# Patient Record
Sex: FEMALE | Race: OTHER
[De-identification: names, ages, dates, MRNs, and addresses within clinical notes are randomized per-mention and may not be internally consistent; named-entity substitution may affect disease eponyms.]

---

## 2017-08-03 ENCOUNTER — HOSPITAL ENCOUNTER (EMERGENCY)
Dept: HOSPITAL 80 - FED | Age: 22
LOS: 1 days | Discharge: HOME | End: 2017-08-04
Payer: COMMERCIAL

## 2017-08-03 VITALS — RESPIRATION RATE: 16 BRPM
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
## 2017-08-03 DIAGNOSIS — S09.90XA: Primary | ICD-10-CM
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
## 2017-08-03 DIAGNOSIS — F17.200: ICD-10-CM
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
## 2017-08-03 LAB
% IMMATURE GRANULYOCYTES: 0.4 % (ref 0–1.1)
ABSOLUTE IMMATURE GRANULOCYTES: 0.03 10^3/UL (ref 0–0.1)
ABSOLUTE NRBC COUNT: 0 10^3/UL (ref 0–0.01)
ADD DIFF?: NO
ADD MORPH?: NO
ADD SCAN?: NO
ANION GAP SERPL CALC-SCNC: 14 MEQ/L (ref 8–16)
ATYPICAL LYMPHOCYTE FLAG: 10 (ref 0–99)
CALCIUM SERPL-MCNC: 10 MG/DL (ref 8.5–10.4)
CHLORIDE SERPL-SCNC: 106 MEQ/L (ref 97–110)
CO2 SERPL-SCNC: 21 MEQ/L (ref 22–31)
CREAT SERPL-MCNC: 0.6 MG/DL (ref 0.6–1)
ERYTHROCYTE [DISTWIDTH] IN BLOOD BY AUTOMATED COUNT: 13.2 % (ref 11.5–15.2)
ETHANOL SERPL-MCNC: < 10 MG/DL (ref 0–10)
FRAGMENT RBC FLAG: 0 (ref 0–99)
GFR SERPL CREATININE-BSD FRML MDRD: > 60 ML/MIN/{1.73_M2}
GLUCOSE SERPL-MCNC: 83 MG/DL (ref 70–100)
HCT VFR BLD CALC: 42.3 % (ref 38–47)
HGB BLD-MCNC: 14 G/DL (ref 12.6–16.3)
LEFT SHIFT FLG: 0 (ref 0–99)
LIPEMIA HEMOLYSIS FLAG: 80 (ref 0–99)
MCH RBC BLDCO QN: 29.2 PG (ref 27.9–34.1)
MCHC RBC AUTO-ENTMCNC: 33.1 G/DL (ref 32.4–36.7)
MCV RBC AUTO: 88.3 FL (ref 81.5–99.8)
NRBC-AUTO%: 0 % (ref 0–0.2)
PLATELET # BLD: 231 10^3/UL (ref 150–400)
PLATELET CLUMPS FLAG: 0 (ref 0–99)
PMV BLD AUTO: 10.8 FL (ref 8.7–11.7)
POTASSIUM SERPL-SCNC: 3.5 MEQ/L (ref 3.5–5.2)
RBC # BLD AUTO: 4.79 10^6/UL (ref 4.18–5.33)
SODIUM SERPL-SCNC: 141 MEQ/L (ref 134–144)

## 2017-08-03 PROCEDURE — G0480 DRUG TEST DEF 1-7 CLASSES: HCPCS

## 2017-08-03 NOTE — EDPHY
H & P


Stated Complaint: head injury at work, hit head against cabinet





- Personal History


LMP (Females 10-55): Unknown


Current Tetanus/Diphtheria Vaccine: Unsure


Current Tetanus Diphtheria and Acellular Pertussis (TDAP): No





- Medical/Surgical History


Hx Asthma: No


Hx Chronic Respiratory Disease: No


Hx Diabetes: No


Hx Cardiac Disease: No


Hx Renal Disease: No


Hx Cirrhosis: No


Hx Alcoholism: No


Hx HIV/AIDS: No


Hx Splenectomy or Spleen Trauma: No


Other PMH: Thyroid nodule, Treatment I131, ADHD, marijuana, depression





- Social History


Smoking Status: Light smoker


Time Seen by Provider: 08/03/17 22:51


HPI/ROS: 





CHIEF COMPLAINT:  Head injury





HISTORY OF PRESENT ILLNESS:  21-year-old female history of depression, arrives 

via private vehicle, not a trauma activation, complaining of acute head injury, 

altered mentation after she was at work at [employer] this evening, she 

remembers cleaning, stood up and hit her head with positive loss of 

consciousness.  She has been vomiting, complaining of nausea, dizziness, 

photophobia, headache , friend states that she is altered.  She denies alcohol 

or drug use.  Denies midline C-spine pain.  Denies back pain.  Denies 

peripheral paresthesia, weakness, numbness





PRIMARY CARE PROVIDER: worker's compensation 





REVIEW OF SYSTEMS:


A ten point review of systems was performed and is negative with the exception 

of the items mentioned in the HPI








PAST MEDICAL/SURGICAL HISTORY:  Depression.  No anticoagulant use history.  





SOCIAL HISTORY: denies alcohol use at time of incident





*********





PHYSICAL EXAM 





1) GENERAL: Well-developed, well-nourished, alert and oriented. Covering her 

face, averse to light and sound.  She is crying, hyperventilating


2) HEAD: Normocephalic, tender to palpation left frontal and parietal region.  

No hematoma.  No depression. 


3) HEENT: Pupils equal, round, reactive to light bilaterally. Negative Horners. 

Nasopharynx, oropharynx, clear.   No deformity or angulation of nose.  No 

septal hematoma.  No rhinorrhea. No oral trauma. Ears bilaterally with normal 

tympanic membranes.  No hemotympanum.  No fluid or blood in the external 

auditory canal.  No raccoon eyes.  No Bernard sign.  Teeth are normally aligned 

with no gross malocclusion, TMJ bilaterally nontender, facial bones nontender 

including the zygomatic arch, maxilla mandible.


4) NECK:  No cervical collar is on. Posterior cervical spine is nontender, no 

stepoff, no effusion. Full range of motion which does not elicit any midline 

cervical spine pain, no posterior midline tenderness, no step-off.  


5) LUNGS: Clear to auscultation bilaterally, no wheezes, no rhonchi, no 

retractions.  No obvious signs of trauma.  No chest wall pain.  No flaring, no 

grunting.  Moving symmetrically.  No crepitus. 


6) HEART: Regular rate and rhythm, 


7) ABDOMEN: No guarding, no rebound, no focal tenderness, no peritoneal signs, 

no signs of trauma, no ecchymosis


8) MUSCULOSKELETAL: Moving all extremities, no focal areas of tenderness, no 

obvious trauma.


9) BACK: No midline vertebral tenderness, no fluctuance, no step-off, no 

obvious trauma, no visual or palpable abnormality.


10) SKIN:  No laceration.  No abrasion





***********





DIFFERENTIAL DIAGNOSIS: Not necessarily in any particular order, my 

differential diagnosis includes, but is not limited to, concussion, skull 

fracture, intraparenchymal contusion, subarachnoid, subdural and epidural 

hematoma.  The patient understands that this diagnosis is provisional and can 

never be 100% accurate. (JACKY Michel)


Constitutional: 


 Initial Vital Signs











Temperature (C)  36.9 C   08/03/17 22:46


 


Heart Rate  77   08/03/17 22:46


 


Respiratory Rate  16   08/03/17 22:46


 


Blood Pressure  146/98 H  08/03/17 22:46


 


O2 Sat (%)  98   08/03/17 22:46








 











O2 Delivery Mode               Room Air














Allergies/Adverse Reactions: 


 





No Known Allergies Allergy (Unverified 08/03/17 22:53)


 








Home Medications: 














 Medication  Instructions  Recorded


 


Adderall 10 mg Tablet  04/04/17


 


Paroxetine HCl  PO 08/03/17














Medical Decision Making





- Diagnostics


Imaging: Discussed imaging studies w/ On call Radiologist


ED Course/Re-evaluation: 





10:58 p.m.Head CT ordered in this patient for trauma for the following 

indication: severe headache, vomiting, loss of consciousness and headache.





11:20 p.m.:  Called to patient's bedside as she is hyperventilating, she is 

crying, states that she is having an anxiety attack because she dropped her 

cell phone on the floor.  Will administer IV Ativan and revaluate





12:19 a.m.:  Re-evaluation, patient is sitting upright, answering questions 

appropriately, no short-term memory loss, nonfocal neurologic exam.  Discussed 

the negative imaging results.  Her anxiety has resolved according to the 

patient.  She feels comfortable being discharged.  Usual and customary head 

injury precautions and instructions have been provided as well as 2nd impact 

syndrome.  All questions and concerns addressed by myself. (JACKY Michel)


Other Provider: 





PHYSICIAN DOCUMENTATION:


The patient was evaluated and managed by the Physician Assistant.  My co-

signature indicates that I have reviewed this chart and I agree with the 

findings and plan of care as documented.  I am the secondary supervising 

physician.


 (Edwina Guerrero)





- Data Points


Laboratory Results: 


 Laboratory Results





 08/03/17 23:00 





 08/03/17 23:00 








Medications Given: 


 








Discontinued Medications





Lorazepam (Ativan Injection)  1 mg IVP EDNOW ONE


   Stop: 08/03/17 23:22


   Last Admin: 08/03/17 23:24 Dose:  1 mg








Departure





- Departure


Disposition: Home, Routine, Self-Care


Clinical Impression: 


Head injury


Qualifiers:


 Encounter type: initial encounter Qualified Code(s): S09.90XA - Unspecified 

injury of head, initial encounter





Condition: Good


Instructions:  Head Injury (ED)


Additional Instructions: 


ALTHOUGH THERE IS NO EVIDENCE OF SERIOUS HEAD INJURY AT THIS TIME, DELAYED 

SIGNS CAN APPEAR 24 TO 48 HOURS AFTER INJURY.  WE RECOMMEND THAT YOU DESIGNATE 

A FRIEND OR FAMILY MEMBER TO OBSERVE YOU OVER THE NEXT FEW DAYS TO ENSURE THAT 

YOUR CONDITION IS PROGRESSING NORMALLY.  PLEASE RETURN TO THE EMERGENCY 

DEPARTMENT (ED) IMMEDIATELY IF YOU HAVE INCREASED HEADACHE, PERSISTENT HEADACHE

, VOMITING, WEAKNESS, CONFUSION OR VISUAL PROBLEMS.  WE RECOMMEND THAT YOU DO 

NOT RESUME CONTACT SPORTS OR ACTIVITIES THAT TAKE COORDINATION OR BALANCE SUCH 

AS SKIING OR RIDING A BICYCLE UNTIL CLEARED TO DO SO BY YOUR DOCTOR OR BY A 

NEUROLOGIST.


Referrals: 


Follow-up, with your work comp provider in 1-2 days [Other] - 1-2 days without 

fail

## 2017-08-04 VITALS
SYSTOLIC BLOOD PRESSURE: 118 MMHG | DIASTOLIC BLOOD PRESSURE: 73 MMHG | OXYGEN SATURATION: 96 % | TEMPERATURE: 98.2 F | HEART RATE: 81 BPM

## 2018-01-31 ENCOUNTER — HOSPITAL ENCOUNTER (INPATIENT)
Dept: HOSPITAL 80 - FED | Age: 23
LOS: 5 days | Discharge: HOME | DRG: 885 | End: 2018-02-05
Attending: PSYCHIATRY & NEUROLOGY | Admitting: PSYCHIATRY & NEUROLOGY
Payer: COMMERCIAL

## 2018-01-31 DIAGNOSIS — R45.851: ICD-10-CM

## 2018-01-31 DIAGNOSIS — F31.60: Primary | ICD-10-CM

## 2018-01-31 DIAGNOSIS — J30.2: ICD-10-CM

## 2018-01-31 DIAGNOSIS — F12.959: ICD-10-CM

## 2018-01-31 DIAGNOSIS — F90.9: ICD-10-CM

## 2018-01-31 DIAGNOSIS — Z91.5: ICD-10-CM

## 2018-01-31 DIAGNOSIS — E03.9: ICD-10-CM

## 2018-01-31 LAB
CK SERPL-CCNC: 89 IU/L (ref 0–156)
PLATELET # BLD: 220 10^3/UL (ref 150–400)

## 2018-01-31 PROCEDURE — G0480 DRUG TEST DEF 1-7 CLASSES: HCPCS

## 2018-01-31 NOTE — EDPHY
HPI/HX/ROS/PE/MDM


Narrative: 





CHIEF COMPLAINT: Suicidal ideation, intentional overdose 4 days ago





HPI: This is a 23 y/o female with a history of depression and prior suicide 

attempts who arrives voluntarily with her friends for evaluation after an 

intentional medication overdose 4 days ago. She reports taking 28 pills of 

50mcg levothyroxine, 9 pills of 25mcg levothyroxine, 9 pills of 20mg Paxil, and 

22 pills 10mg Adderall. She says, "I took everything I had" and "I was trying 

to kill myself." She went to sleep and upon waking the next day she went to 

work. She denies vomiting, abdominal pain, or other symptoms after this 

ingestion. Her friends convinced her to come to the ED today for evaluation. 

She has previously attempted to kill herself by suffocation with a bag over her 

head. She denies recent alcohol or drug use. She currently has mild 

intermittent chest pain and mild dyspnea, general shakiness, and intermittent 

"hot and cold flashes."





REVIEW OF SYSTEMS:


Aside from elements discussed in the HPI, a comprehensive 10-point review of 

systems was reviewed and is negative.





PMH: Asthma, depression, prior suicide attempts





SOCIAL HISTORY: Lives in [place]. Single. Employed. 





PHYSICAL EXAM:


General:Patient is alert, in no acute distress.


ENT:Eyes are normal to inspection.  ENT inspection normal.


Neck: Normal inspection.  Full range of motion.


Respiratory:No respiratory distress.  Breath sounds normal bilaterally.


Cardiovascular: Regular rate and rhythm.  Strong peripheral pulses.  Normal cap 

refill.


Abdomen:The abdomen is nontender to palpation. There are no peritoneal signs.


Back: Normal to inspection.  No tenderness to palpation.


Skin: Normal color.  No rash.  Warm and dry.


Extremities: Normal appearance.  Full range of motion.


Neuro: Oriented x3.  Normal motor function.  Normal sensory function. (Tyson Dahl)


ED Course: 


This is a 23 y/o female with depression and SI who presents for evaluation 4 

days after suicidal attempt by drug overdose. She has since gone to work and 

experienced intermittent chest pain, dyspnea, and hot/cold flashes. Her exam is 

unremarkable today. Plan for IV, labs, EKG, and consult with poison control 

prior to mental health evaluation. The medications she took have some potential 

for arrhythmias, but given the relatively low doses, length of time since 

ingestion, and benign exam, my suspicion is low for acute toxicity. 





1845: Consulted with [place] Poison Control case #[id number]. They confirm 

dosages of these specific medications are not likely to cause symptoms. If her 

labs are normal she will be medically cleared. 





1900: I have placed patient on an M1 hold for suicide attempt by drug overdose. 





The 12 lead EKG was interpreted by myself. Sinus rhythm. See hard copy and/or 

"tracemaster" electronic copy for interpretation. (Tyson Dahl)





Patient has been accepted at [address] [place] [address] psychiatric [facility].  

Dr. Antoine has been accepted. EMTALA Filled out.  Appropriate transfer be set 

up. (Nav Bose)





- Data Points


Laboratory Results: 





 Laboratory Results





 01/31/18 18:36 





 01/31/18 18:36 





 











  01/31/18 01/31/18 01/31/18





  20:50 18:36 18:36


 


WBC      





    


 


RBC      





    


 


Hgb      





    


 


Hct      





    


 


MCV      





    


 


MCH      





    


 


MCHC      





    


 


RDW      





    


 


Plt Count      





    


 


MPV      





    


 


Neut % (Auto)      





    


 


Lymph % (Auto)      





    


 


Mono % (Auto)      





    


 


Eos % (Auto)      





    


 


Baso % (Auto)      





    


 


Nucleat RBC Rel Count      





    


 


Absolute Neuts (auto)      





    


 


Absolute Lymphs (auto)      





    


 


Absolute Monos (auto)      





    


 


Absolute Eos (auto)      





    


 


Absolute Basos (auto)      





    


 


Absolute Nucleated RBC      





    


 


Immature Gran %      





    


 


Immature Gran #      





    


 


Sodium      145 mEq/L mEq/L





     (135-145) 


 


Potassium      3.8 mEq/L mEq/L





     (3.5-5.2) 


 


Chloride      108 mEq/L mEq/L





     () 


 


Carbon Dioxide      20 mEq/l L mEq/l





     (22-31) 


 


Anion Gap      17 mEq/L H mEq/L





     (8-16) 


 


BUN      10 mg/dL mg/dL





     (7-23) 


 


Creatinine      0.6 mg/dL mg/dL





     (0.6-1.0) 


 


Estimated GFR      > 60 





    


 


Glucose      89 mg/dL mg/dL





     () 


 


Calcium      9.5 mg/dL mg/dL





     (8.5-10.4) 


 


Creatine Kinase      89 IU/L IU/L





     (0-156) 


 


Beta HCG, Qual    NEGATIVE   





    


 


Salicylates      < 1.0 mg/dL L mg/dL





     (2.0-20.0) 


 


Urine Opiates Screen  NEGATIVE     





   (NEGATIVE)   


 


Acetaminophen      < 10 mcg/mL L mcg/mL





     (10-30) 


 


Urine Barbiturates  NEGATIVE     





   (NEGATIVE)   


 


Ur Phencyclidine Scrn  NEGATIVE     





   (NEGATIVE)   


 


Ur Amphetamine Screen  NEGATIVE     





   (NEGATIVE)   


 


U Benzodiazepines Scrn  NEGATIVE     





   (NEGATIVE)   


 


Urine Cocaine Screen  NEGATIVE     





   (NEGATIVE)   


 


U Marijuana (THC) Screen  NON-NEGATIVE  H     





   (NEGATIVE)   














  01/31/18





  18:36


 


WBC  5.44 10^3/uL 10^3/uL





   (3.80-9.50) 


 


RBC  4.67 10^6/uL 10^6/uL





   (4.18-5.33) 


 


Hgb  13.7 g/dL g/dL





   (12.6-16.3) 


 


Hct  40.4 % %





   (38.0-47.0) 


 


MCV  86.5 fL fL





   (81.5-99.8) 


 


MCH  29.3 pg pg





   (27.9-34.1) 


 


MCHC  33.9 g/dL g/dL





   (32.4-36.7) 


 


RDW  12.3 % %





   (11.5-15.2) 


 


Plt Count  220 10^3/uL 10^3/uL





   (150-400) 


 


MPV  10.6 fL fL





   (8.7-11.7) 


 


Neut % (Auto)  68.3 % %





   (39.3-74.2) 


 


Lymph % (Auto)  25.2 % %





   (15.0-45.0) 


 


Mono % (Auto)  4.8 % %





   (4.5-13.0) 


 


Eos % (Auto)  0.9 % %





   (0.6-7.6) 


 


Baso % (Auto)  0.6 % %





   (0.3-1.7) 


 


Nucleat RBC Rel Count  0.0 % %





   (0.0-0.2) 


 


Absolute Neuts (auto)  3.72 10^3/uL 10^3/uL





   (1.70-6.50) 


 


Absolute Lymphs (auto)  1.37 10^3/uL 10^3/uL





   (1.00-3.00) 


 


Absolute Monos (auto)  0.26 10^3/uL L 10^3/uL





   (0.30-0.80) 


 


Absolute Eos (auto)  0.05 10^3/uL 10^3/uL





   (0.03-0.40) 


 


Absolute Basos (auto)  0.03 10^3/uL 10^3/uL





   (0.02-0.10) 


 


Absolute Nucleated RBC  0.00 10^3/uL 10^3/uL





   (0-0.01) 


 


Immature Gran %  0.2 % %





   (0.0-1.1) 


 


Immature Gran #  0.01 10^3/uL 10^3/uL





   (0.00-0.10) 


 


Sodium  





  


 


Potassium  





  


 


Chloride  





  


 


Carbon Dioxide  





  


 


Anion Gap  





  


 


BUN  





  


 


Creatinine  





  


 


Estimated GFR  





  


 


Glucose  





  


 


Calcium  





  


 


Creatine Kinase  





  


 


Beta HCG, Qual  





  


 


Salicylates  





  


 


Urine Opiates Screen  





  


 


Acetaminophen  





  


 


Urine Barbiturates  





  


 


Ur Phencyclidine Scrn  





  


 


Ur Amphetamine Screen  





  


 


U Benzodiazepines Scrn  





  


 


Urine Cocaine Screen  





  


 


U Marijuana (THC) Screen  





  














General


Time Seen by Provider: 01/31/18 18:29


Initial Vital Signs: 





 Initial Vital Signs











Temperature (C)  36.6 C   01/31/18 18:22


 


Heart Rate  88   01/31/18 18:22


 


Respiratory Rate  18   01/31/18 18:22


 


Blood Pressure  119/81 H  01/31/18 18:22


 


O2 Sat (%)  96   01/31/18 18:22








 











O2 Delivery Mode               Room Air














Allergies/Adverse Reactions: 


 





No Known Allergies Allergy (Verified 01/31/18 18:21)


 








Home Medications: 














 Medication  Instructions  Recorded


 


Adderall 10 mg Tablet  04/04/17


 


Paroxetine HCl PO 08/03/17


 


Levothyroxine  01/31/18














Departure





- Departure


Disposition: [facility] IP


Clinical Impression: 


Suicidal behavior


Qualifiers:


 Attempted self-injury: without attempted self-injury Qualified Code(s): R46.89 

- Other symptoms and signs involving appearance and behavior





Condition: Fair


Referrals: 


VAL LARIOS [Primary Care Provider] - As per Instructions


Report Scribed for: Tyson Dahl


Report Scribed by: Farhana Paulino


Date of Report: 01/31/18


Time of Report: 18:53


Physician Review and Approval Statement: Portions of this note were transcribed 

by an ED scribe.  I personally performed the history, physical exam, and 

medical decision making; and confirm the accuracy of the information in the 

transcribed note.

## 2018-02-01 VITALS — RESPIRATION RATE: 14 BRPM

## 2018-02-01 RX ADMIN — CETIRIZINE HYDROCHLORIDE SCH MG: 10 TABLET, FILM COATED ORAL at 12:32

## 2018-02-01 NOTE — BCON
[f 
rep st]



                                                  BEHAVIORAL HEALTH CONSULTATION





INTERNAL MEDICINE CONSULTATION



DATE OF CONSULTATION:  02/01/2018



REFERRING PHYSICIAN:  Guy Antione MD



REASON FOR REFERRAL:  Medical clearance for inpatient behavioral health stay.



HISTORY OF PRESENT ILLNESS:  This patient was brought into the emergency 
department with her friends several days after a suicide attempt in which she 
took all of her medications including Adderall, paroxetine, and levothyroxine.  
She was evaluated by the mental health team and admitted for further 
psychiatric care. 



She currently complains of feeling tired and says that she got in late last 
night.



PAST MEDICAL HISTORY:  

1.  Thyroid nodules, status post radioactive ablation.

2.  Hypothyroidism.

3.  Asthma.

4.  Concussions x2, and she denies any chronic sequelae.

5.  Depression.

6.  Seasonal allergies.



MEDICATIONS: 

1.  Levothyroxine.

2.  Paroxetine.

3.  Adderall.



SOCIAL HISTORY:  She lives with roommates.  She has completed 1 semester of 
college.  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  She 
is a light smoker.  She uses marijuana about twice a week.



FAMILY HISTORY:  Noncontributory.



REVIEW OF SYSTEMS:  She reports a reduced appetite.  She denies weight loss, 
nausea, vomiting, constipation, or diarrhea.  She denies difficulty breathing 
or cough, fevers or chills.  Otherwise, a 10-point review of systems is 
negative.



PHYSICAL EXAM:  VITAL SIGNS:  Blood pressure is 125/68, heart rate is 77, 
respiratory rate is 14, oxygen saturation 97% on room air, temperature is 36.9 
degrees centigrade.  Her weight is 56.7 kg for a body mass index of 22.9.  
GENERAL:  This is a well-nourished, well-developed woman, appears her 
chronologic age, cooperative and in no acute distress.  She is napping with the 
lights off in the room.  She awakens easily and is cooperative with the exam.  
HEENT:  Extraocular movements are intact.  Pupils are equal, round, reactive to 
light.  Mucous membranes are moist.  Dentition is in good condition.  She has 
an uncrowded airway, Mallampati class 1.  NECK:  Supple with no thyromegaly.  
HEART:  Regular rate and rhythm with no murmurs, rubs, or gallops.  LUNGS:  
Clear to auscultation bilaterally.  ABDOMEN:  Benign.  EXTREMITIES:  There is 
no cyanosis, clubbing, or edema.  Radial and dorsalis pedis pulses are 2+ 
bilaterally.  NEUROLOGIC:  She is alert and oriented x3.  Cranial nerves 2-12 
are grossly intact.  There is no focal weakness and sensation is intact to 
light touch.



LABORATORY STUDIES:  From the emergency department, CBC was overall within 
normal limits.  She had a slight decrement of absolute monocytes of no clinical 
significance.  Serum chemistry revealed a slightly low carbon dioxide at 20 and 
a slightly high anion gap at 17.  These are also likely without clinical 
significance.  Otherwise, renal function and electrolytes were within normal 
limits.  Hemoglobin A1c was normal at 5.2.  Lipid panel was benign.  Beta hCG 
was negative for pregnancy.  Toxicology screen in the serum was negative for 
salicylates or acetaminophen, and in the urine was non-negative for marijuana, 
but otherwise negative for substances of abuse.



ASSESSMENT/RECOMMENDATIONS:  

1.  Mental health issues, pending further evaluation and management per 
Psychiatry and the mental health team.

2.  Hypothyroidism, status post radioactive ablation for thyroid nodules.  
Looking at her lab history, she had a suppressed TSH approximately 2 years ago, 
then she had an elevated TSH to 12.3 on 04/22/2017.  Subsequently, her TSH has 
been normal or only slightly elevated.  On 01/09/2018, it was 4.84, which is 
only slightly elevated.  It is possible that she would benefit from a higher 
dose of levothyroxine.  However, this can be done under the care of her primary 
care physician.

3.  Asthma, by history, currently without any symptoms.  Albuterol has been 
ordered on a p.r.n. basis and this is appropriate.

4.  Tobacco dependence.  She was encouraged to quit smoking.

5.  Seasonal allergies.  She has a prescription for scheduled cetirizine, and 
this is appropriate if she has been symptomatic. 



I see no medical contraindications to this patient's continued stay in the 
inpatient behavioral health unit or to any psychiatric medications or 
procedures. 



Thank you very much for including me in the care of this patient.  Please do 
not hesitate to contact me or the hospitalist service should there be need for 
further medical evaluation.





Job #:  [id number]

MTDD

## 2018-02-01 NOTE — BAPA
[f 
rep st]



                                                  ADMISSION PSYCHIATRIC 
ASSESSMENT





IDENTIFICATION:  This is a 22-year-old single [place]ian female, who lives with 
a friend, who is currently unemployed, who has recently enrolled at the 
[employer] [facility] [employer].



CHIEF COMPLAINT:  "I felt really bad."



HISTORY OF PRESENT ILLNESS:  The patient reports on Saturday, January 27, she 
felt depressed, hopeless and suicidal, and overdosed on a combination of 
levothyroxine, Paxil and Adderall.  It appears she took approximately 9 tablets 
of all 3 of these medications.  The patient reports she has been dealing with 
depression and mood swings for about a year and a half.  She reports feeling 
down, sad, hopeless, helpless, with thoughts of death and suicide, along with 
low energy, low activity and excessive amounts of sleep, alternating with 1-4 
days of elevated energy, elevated activity, racing thoughts, impulsive spending
, and having elevated mood and activity.  She denies any paranoia or 
hallucinations.  She denies any recent violent behavior or violent thoughts.  
She reports 1 prior suicide attempt in December 2016, where she tried to 
suffocate herself with a bag.  She reports due to her symptoms, she has been 
unable to work, and she took leave from school.  She completed the fall 
semester at the [employer], but decided to not go to school this 
semester.  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  The 
patient also reports that due to her symptoms, she has had severe fatigue.  She 
has followed up with her primary care provider, who has been prescribing her 
levothyroxine for hypothyroidism.  The patient apparently was initially started 
on 25 mcg a day of levothyroxine, and earlier in January had a borderline 
elevated TSH, and the dose was increased to 50 mcg.  Despite this, the patient 
continued to have weeks at a time with low energy, low activity, and excessive 
sleep and low mood.  The patient reports stress due to having low energy, low 
activity, and mood swings interfering with her ability to go to school or work.
  The patient reports using cannabis twice a week.  Denies nicotine use.  
Denies alcohol use.  Denies other drug abuse.  The patient reports on Saturday, June 27, after overdosing that she slept.  The following morning, Sunday morning
, she told her friends who live nearby that she had overdosed.  They had made 
her an appointment to see her medical doctor on Monday, January 29.  However, 
she overslept and did not go to that appointment.  Then on Tuesday, the 30th, 
her friends encouraged her to go the emergency room for evaluation.  In the 
emergency room, she was placed on an M1 hold due to concern she was a danger to 
herself.  The patient reports after overdosing on her medications on January 27
, that she has not taking any of the 3 medications at all.  The patient denies 
any recent change in her physical health other than low energy.  She denies 
headaches, visual changes, weakness, tremors, nausea, vomiting or diarrhea.



PAST PSYCHIATRIC HISTORY:  The patient denies psychiatric hospitalizations in 
the past.  She had 1 prior suicide attempt in December 2016, where she tried to 
strangle herself or suffocate herself.  She does report that she is taking 
Paxil 20mg for depression for about 12 months without clear benefit.  She does 
report taking Adderall for ADHD starting in September.  She reports this was 
beneficial.  In the prescription drug monitoring program, it lists Adderall XR 
10 mg daily from her primary care provider, Dr. Han Conley.  The patient 
denies other psychiatric medication trials.  She denies any history of violence 
toward others or any arrests.  She is not currently in any outpatient 
psychiatric treatment, and is getting her psychiatric medications from her 
primary care provider.  She has seen a therapist at [facility] named Petty Johnson in the past.



ALLERGIES:  No known drug allergies.



PAST MEDICAL HISTORY:  She has seasonal allergies and normally takes Zyrtec.  
She has a history of asthma and uses p.r.n. albuterol inhaler.  She may have 
had 2 concussions with loss of consciousness lasting a few seconds at a time, 
not requiring any type of medical treatment in the past.  She denies surgeries 
on her body.



LABS:  The emergency department physician read her EKG as being within normal 
limits, but the actual report for that is not available at this time.  



Her CBC was within normal limits, with a white blood cell count 5.4, hemoglobin 
13.7, platelet count 220, sodium 145, potassium 3.8, creatinine 0.6, glucose 
89.  Serum beta HCG was negative.  Add on labs ordered today include a lipid 
panel, which showed triglycerides 68, LDL 71, HDL 64, which are all normal.  
Hemoglobin A1c is pending.  Her urine drug screen in the Emergency Department 
was positive for cannabis, but negative for other drugs of abuse.  Her serum 
was negative for acetaminophen or salicylates as well.  Of note, in the old 
records in our system, it looks like on January 9, 2018, she had a TSH 4.8, 
free T4 1.02, free T3 4.4.  The patient reports at that time, she had been 
taking 25 mcg a day of levothyroxine, and the dose was increased to 50 mcg 
after that.  In November 2017, her liver function tests were normal.  



In August 2017, she had a head CT that was normal.



VITAL SIGNS:  She is 157 cm tall, 56.6 kg with a BMI of 22.9.  Her blood 
pressure is 125/68, heart rate 77, respiratory rate 14, pulse ox 97% on room air
, temperature afebrile.



MENTAL STATUS EXAM:  She is a white female in no acute distress.  She is 
ambulatory.  She is wearing all black clothing.  She has glasses.  She has mild 
acne.  She has no focal tremors or weakness.  Her speech is soft, regular rate 
and rhythm.  Her affect is labile at times.  She is smiling and joking.  Other 
times she is sad and dysphoric.  Her thoughts are organized with fair detail.  
Her mood is "Up and down, sometimes deep depression, other times happiness.  
Sometimes I cry and upset.  Other times I am tired and sleep too much."  She 
denies plans to hurt herself on the unit.  She reports thoughts of hopelessness 
and being dead, but denies a plan to harm herself at this point.  She denies 
violent thoughts.  She denies auditory hallucinations or paranoia.  Her insight 
appears to be limited.  Her judgment appears to be questionable at this point.



ASSESSMENT:  

Bipolar disorder type 2, most recent episode depressed with mixed features.  

Rule out cannabis use disorder, financial and occupational stressors.  

History of ADHD



The overall assessment is the patient has been on Paxil for about a year, as 
well as Adderall for about 5 months, but is having alternating hypomanic and 
depressive episodes.  The patient reports having mixed manic and depressive 
symptoms over the past 2 weeks with an impulsive overdose on Saturday, January 27.  The patient has been off medications since then, which is about 5 days.  
The patient has fair insight and is willing to get treatment, but appears 
labile and possibly impulsive.



PLAN:  

1.  The patient is on M1 hold for evaluation due to concerns she is a danger to 
herself.

2.  The patient is on suicide precautions on the unit.

3.  We will discontinue Paxil and Adderall.  The patient has not taken other 
medications for about 5 days since her overdose.

4.  Restart the patient's Zyrtec for seasonal allergies.

5.  We will restart the patient's levothyroxine 50 mcg daily.  The patient will 
be seen by a hospitalist either today or tomorrow for a baseline physical exam.

6.  The patient was provided with education about bipolar disorder.  She was 
also given information about the risks of psychiatric medications causing birth 
defects and miscarriage.

7.  Discussed the risks and benefits of lithium, Depakote, Seroquel, and 
Abilify.  The patient prefers to start Abilify.  Discussed that Abilify can 
interact with Paxil causing elevated Abilify levels, and so it is unsafe to 
restart Paxil while taking Abilify.  The patient was agreeable to start Abilify 
after discussion of the risks of tardive dyskinesia, neuroleptic malignant 
syndrome, metabolic syndrome, birth defects and miscarriage.  We will start 1 
mg today, monitor for side effects, and then increase to 2 mg tomorrow in the 
morning as a mood stabilizer.

8.  I left a message at [facility], phone [phone] for 
Dr. Han Conley.  The patient did sign a release information.  I left a 
message that the patient's Paxil and Adderall were being discontinued, and the 
patient will be started on a mood stabilizer, and referred to outpatient 
psychiatric followup after discharge.

9.  The patient has a p.r.n. albuterol inhaler if having asthma symptoms on the 
unit.

10.  There is a hemoglobin A1c pending.

11.  The patient's primary supports outside of her friends are her mother and 
stepfather in [place].  The care coordinator will obtain release 
information for them to coordinate discharge planning.

12.  The patient has lorazepam 0.5 mg p.o. q.4 hours p.r.n. ordered for severe 
anxiety.  If having severe agitation on the unit, olanzapine 5 mg p.o. q.6 
hours p.r.n. for severe agitation.

13.  We will monitor the patient's mood stability, impulse control, judgment, 
behavior, and risk of self-harm on the inpatient unit.





Job #:  [id number]

MTDD

## 2018-02-02 RX ADMIN — LEVOTHYROXINE SODIUM SCH MCG: 50 TABLET ORAL at 08:22

## 2018-02-02 RX ADMIN — CETIRIZINE HYDROCHLORIDE SCH MG: 10 TABLET, FILM COATED ORAL at 08:22

## 2018-02-02 NOTE — SOAPPROG
SOAP Progress Note


Assessment/Plan: 


Assessment:





Bipolar Disorder type 2, most recent depressed with mixed features


Thyroid disease (history of hyperthyroidism S/P nodule ablation, now hypothyroid

), on levothyroxine





Patient has improved affect and appears more hopeful with better judgment.


Patient may be at risk for elevated Abilify level (recent Paroxetine use - 

CYP2D6 inhibitor)





Plan:


Patient agrees to voluntary treatment 


SP-1 precaution


Continue Abilify 2mg QAM for mood stability, monitor for EPS


Monitor mood symptoms, risk of self-harm


Possible discharge Monday 2/5/18 after family meeting with mother who is 

traveling from [place] if symptoms stable








02/02/18 12:53





Subjective: 





CC:  "Alright, bored"





Patient reports tolerating Abilify without side effects.  Reports mood mostly 

stable today, denies racing thoughts, agitation, or severe dysphoria.  Reports 

good visit from 3 friends yesterday.  Reports mother will arrive in [place] 

Sunday.  Reports wanting to live for family and friends and to complete college 

degree in film.  Reports feeling somewhat tired today, unsure if that is from 

medication.  Denies plan to harm self on unit.  


Objective: 





 Vital Signs











Temp Pulse Resp BP Pulse Ox


 


 36.6 C   76   14   93/54 L  96 


 


 02/02/18 06:00  02/02/18 06:00  02/02/18 06:00  02/02/18 06:00  02/02/18 06:00








Alert HF.  Calm and cooperative with better eye contact.  Affect euthymic.  

Mood 'alright, bored'  Thoughts organized.  Denies SI or HI.  Denies AH or 

paranoia.  Fair insight.  Appropriate judgment





Staff report patient slept 9 hours, attending groups, calm and appropriate on 

the unit.





- Time Spent With Patient


Time Spent With Patient: 





35 minutes








- Pending Discharge


Pending Discharge Within 24 Hours: No


Pending Discharge Within 48 Hours: No





ICD10 Worksheet


Patient Problems: 


 Problems











Problem Status Onset


 


Bipolar 2 disorder Acute  


 


Hypothyroidism Acute  


 


Suicidal behavior Acute

## 2018-02-03 RX ADMIN — LEVOTHYROXINE SODIUM SCH MCG: 50 TABLET ORAL at 08:20

## 2018-02-03 RX ADMIN — CETIRIZINE HYDROCHLORIDE SCH MG: 10 TABLET, FILM COATED ORAL at 08:20

## 2018-02-03 NOTE — SOAPPROG
SOAP Progress Note


Assessment/Plan: 


Assessment:








Per Dr. Barrios's note:


Assessment:





Bipolar Disorder type 2, most recent depressed with mixed features


Thyroid disease (history of hyperthyroidism S/P nodule ablation, now hypothyroid

), on levothyroxine





Patient has improved affect and appears more hopeful with better judgment.


Patient may be at risk for elevated Abilify level (recent Paroxetine use - 

CYP2D6 inhibitor)





Plan:


Patient agrees to voluntary treatment 


SP-1 precaution


Continue Abilify 2mg QAM for mood stability, monitor for EPS


Monitor mood symptoms, risk of self-harm


Possible discharge Monday 2/5/18 after family meeting with mother who is 

traveling from [place] if symptoms stable














Plan:





02/03/18 15:02


1. Patient continues to tolerate Abilify, denies any SE's.


2. Reports improved mood, no SI. 


3. Looking forward to going back to [place] with MOC


Subjective: 


Met with patient, reviewed chart and d/w staff. Patient presents bright affect, 

cheerful, smiling, talking in group therapy. She says she is feeling "refreshed

" today and in "much better" mood overall, no longer depressed, feels more 

positive and optimistic about future. Denies any SI/HI. 





Objective: 





 Vital Signs











Temp Pulse Resp BP Pulse Ox


 


 36.4 C   76   14   93/54 L  96 


 


 02/03/18 06:00  02/02/18 06:00  02/02/18 06:00  02/02/18 06:00  02/02/18 06:00








MSE: Affect: Euthymic  Mood: "Better"  TP: Linear, goal-directed  TC: Denies 

any SI/HI, no AH/VH  Insight/Judgment: Fair





- Time Spent With Patient


Time Spent With Patient: 


20"








- Pending Discharge


Pending Discharge Within 24 Hours: No


Pending Discharge Within 48 Hours: No





ICD10 Worksheet


Patient Problems: 


 Problems











Problem Status Onset


 


Asthma Acute  


 


Bipolar 2 disorder Acute  


 


Hypothyroidism Acute  


 


Suicidal behavior Acute

## 2018-02-04 RX ADMIN — LEVOTHYROXINE SODIUM SCH MCG: 50 TABLET ORAL at 09:22

## 2018-02-04 RX ADMIN — CETIRIZINE HYDROCHLORIDE SCH MG: 10 TABLET, FILM COATED ORAL at 08:58

## 2018-02-04 NOTE — SOAPPROG
SOAP Progress Note


Assessment/Plan: 


Assessment:








Per Dr. Barrios's note:


Assessment:





Bipolar Disorder type 2, most recent depressed with mixed features


Thyroid disease (history of hyperthyroidism S/P nodule ablation, now hypothyroid

), on levothyroxine





Patient has improved affect and appears more hopeful with better judgment.


Patient may be at risk for elevated Abilify level (recent Paroxetine use - 

CYP2D6 inhibitor)





Plan:


Patient agrees to voluntary treatment 


SP-1 precaution


Continue Abilify 2mg QAM for mood stability, monitor for EPS


Monitor mood symptoms, risk of self-harm


Possible discharge Monday 2/5/18 after family meeting with mother who is 

traveling from [place] if symptoms stable














Plan:





02/03/18 15:02


1. Patient continues to tolerate Abilify, denies any SE's.


2. Reports improved mood, no SI. 


3. Looking forward to going back to [place] with MOC





02/04/18 13:38


1. CCM - stable on Abilify, denies any SI/HI


2. Patient very excited about visitors this afternoon, eagerly awaiting MOC's 

arrival from [place]





Subjective: 


Met with patient, reviewed chart and d/w staff. Patient has bright, cheerful 

affect. She was very excited about her MOC coming possibly tonight or tomorrow. 

She denies any SI/HI, not feeling depressed any more. 





Objective: 





 Vital Signs











Temp Pulse Resp BP Pulse Ox


 


 36.6 C   73   14   98/60 L  96 


 


 02/04/18 06:00  02/04/18 06:00  02/04/18 06:00  02/04/18 06:00  02/04/18 06:00








MSE: Affect: Euthymic  Mood: "Good"  TP: Linear  TC: No SI/HI  Insight/Judgment

: Fair





- Time Spent With Patient


Time Spent With Patient: 


20"








- Pending Discharge


Pending Discharge Within 24 Hours: No


Pending Discharge Within 48 Hours: No





ICD10 Worksheet


Patient Problems: 


 Problems











Problem Status Onset


 


Asthma Acute  


 


Bipolar 2 disorder Acute  


 


Hypothyroidism Acute  


 


Suicidal behavior Acute

## 2018-02-04 NOTE — CPEKG
Heart Rate: 89

RR Interval: 674

P-R Interval: 132

QRSD Interval: 76

QT Interval: 372

QTC Interval: 453

P Axis: -22

QRS Axis: 64

T Wave Axis: 18

EKG Severity - NORMAL ECG -

EKG Impression: SINUS RHYTHM

Electronically Signed By: Lizeth King 07-Feb-2018 09:08:59

## 2018-02-05 VITALS
OXYGEN SATURATION: 97 % | TEMPERATURE: 98.4 F | DIASTOLIC BLOOD PRESSURE: 70 MMHG | HEART RATE: 97 BPM | SYSTOLIC BLOOD PRESSURE: 111 MMHG

## 2018-02-05 RX ADMIN — CETIRIZINE HYDROCHLORIDE SCH MG: 10 TABLET, FILM COATED ORAL at 07:55

## 2018-02-05 NOTE — BDS
[f 
rep st]



                                                  BEHAVIORAL HEALTH DISCHARGE 
SUMMARY

DATE OF DISCHARGE:  February 5th 2018.



ADMITTING DIAGNOSES:  

1.  Bipolar disorder type 2, most recent episode depressed with mixed features.
  

2.  History of attention deficit hyperactivity disorder.



IDENTIFICATION:  This is a 22-year-old single  female who lives with 
friends here in [place].  She is single with no children.  Her Mother and 
Stepfather live in [place].  The patient is not currently in school, but 
has completed 3 semesters at the [employer] studying film.



BRIEF PSYCHIATRIC AND MEDICAL HISTORY:  Please see psychiatric assessment 
history dated February 1, 2018.  Patient was receiving Paxil and Adderall from 
her primary care provider.  She has a history of a thyroid nodule treatment 
with radioactive iodine and subsequent hypothyroidism, as well as a history of 
asthma.



ALLERGIES:  No known drug allergies.



REASON FOR ADMISSION:  The patient was taken to the emergency room by her 
friends.  She had reported that she had overdosed on Paxil, levothyroxine, and 
Adderall approximately 5 days prior to admission to the emergency department on 
January 31, 2018.  She was then admitted to the inpatient unit on M1 hold due 
to concern she was a danger to herself.



INITIAL EXAMINATION:  She was alert,  female who is thin.  She had some 
acne, glasses.  She wears dark clothing.  Her speech is regular rate and 
rhythm.  Her affect was labile.  She described her mood as "up and down," and 
described both depressive and hypomanic symptoms and racing thoughts along with 
poor attention.  She denied further suicidal thoughts, denied homicidal thoughts
, auditory hallucinations or paranoia.  She had fair insight but questionable 
judgment.



HOSPITAL COURSE:  The patient was admitted on an M1 hold and then agreed to 
stay in the hospital voluntarily.  The patient appeared to have bipolar 
disorder type 2, as she reported past hypomanic episodes along with depressive 
episodes.  She also reported history of attention deficit hyperactivity 
disorder symptoms.  The patient's Adderall was discontinued.  She had not taken 
it for 5 days and it was unclear if she had attention deficit hyperactivity 
disorder symptoms separate from her mood disorder.  The patient's Paxil was not 
restarted as it appeared that she was having mixed depressive and hypomanic 
symptoms prior to admission and had not taken Paxil for at least 5 days.  The 
patient was given information about the risks and benefits of taking a mood 
stabilizer including lithium, Depakote, Seroquel, and Abilify.  The patient was 
agreeable to start Abilify.  She was warned about the risk of tardive dyskinesia
, neuroleptic malignant syndrome, metabolic syndrome, and birth defects.  She 
was also counseled about the risks of elevated Abilify levels due to recent 
Paxil use.  The patient was initially given 1 mg of Abilify and did not have 
side effects, and then it was increased to 2 mg daily.  The patient tolerated 
Abilify without side effects.  She had a marked improvement in her mood 
stability.  She reported a subjective improvement in her mood.  She became more 
hopeful about the future.  She denied suicidal thoughts on the unit.  She was 
calm, pleasant, able to attend groups, was eating well and sleeping well on the 
unit.  The patient signed release information for her Mother and her Stepfather 
in [place] to come out to [place] to facilitate discharge.  They will be 
taking the patient at discharge today to assist her in getting her 
prescriptions filled and will assist her in getting outpatient medical and 
mental health followup.  The patient does have a significant history of thyroid 
disease.  She apparently had a thyroid nodule that was ablated using 
radioactive iodine.  She then had hypothyroidism and had elevated TSH in the 
fall of 2017, and was started on Synthroid.  This apparently was increased from 
25 mcg daily to 50 mcg daily in January of 2018.  The patient was counseled 
about the fact that hypothyroidism could contribute to her depression.  The 
patient had negative screening for alcohol use disorder and nicotine use 
disorder.  She was screened for metabolic syndrome, but was negative.  She was 
given information about the risks of cannabis which she smokes once to twice a 
week as far as cannabis causing worsening anxiety and psychiatric problems.  
Prior to discharge, the patient had been eating well, sleeping well, attending 
groups, appeared calm and appropriate on the unit, was able to complete a 
safety plan outlining her strengths, coping skills and supports.



LABS:  There are no labs pending.  She had a white blood cell count 5.4, 
hemoglobin 13.7, platelet count 220.  Sodium 145, creatinine 0.6, glucose 89.  
Hemoglobin A1c 5.2, calcium 9.5.  Creatine kinase 89, triglycerides 68, LDL 71, 
HDL 64.  Serum beta HCG was negative.  Salicylates negative.  Acetaminophen 
negative.  Urine drug screen was positive for cannabis only.  Of note, prior to 
admission, the patient had a TSH of 4.8 on January 9, 2018.  This was while 
taking Synthroid 25 mcg a day.  Her outpatient provider increased her Synthroid 
to 50 mcg a day at that point.



PROCEDURES:  There were no procedures done during the psychiatric 
hospitalization.  However, during the emergency room visit, the patient had an 
electrocardiogram done on January 31, 2018 in the emergency room.  This showed 
heart rate 89, QTc interval 453 milliseconds, normal.  Normal EKG with sinus 
rhythm.



CONSULTATIONS:  The patient had a baseline physical exam by Dr. Ching who is a 
hospitalist on February 1, 2018.  



CONDITION ON DISCHARGE:  She is an alert,  female who is pleasant, 
cooperative, ambulatory without tremors or weakness.  Her speech is regular 
rate and rhythm.  Her mood is "good."  Her affect is euthymic and pleasant.  
Her thoughts are organized.  She denies thoughts to hurt herself or others.  
She denies auditory hallucinations or paranoia.  She has fair insight, 
appropriate judgment and fair memory.



DISCHARGE DIAGNOSIS:  

1.  Bipolar disorder type 2, most recent episode depressed with mixed features.
  

2.  History of attention deficit hyperactivity disorder.  Rule out cannabis use 
disorder.

3.  Hypothyroidism. 

4.  History of asthma.

5.  Seasonal allergies and allergic rhinitis



DISCHARGE MEDICATIONS:  Abilify 2 mg by mouth daily, levothyroxine 50 mcg by 
mouth daily, Zyrtec 10 mg by mouth daily, albuterol 2 puffs q.4 hours p.r.n. 
for asthma symptoms.  Of note, the patient's Abilify and levothyroxine 
prescriptions were written to be filled as a bubble pack after discharge to 
prevent impulsive overdose.



DISPOSITION:  The patient is leaving the unit with her Mother and Stepfather to 
return to [place].  A 25 minute meeting with mother, using a [employer] 
phone , occurred on day of discharge.  Mother was agreeable to 
monitor the patients medication compliance and assist her in getting medical 
and psychiatric follow up.



FOLLOWUP:  The patient has a psychiatrist and an endocrinologist in [place] 
[place].  The patient was given information about [facility] if 
she returns to [place] to enroll at the [facility] for medical and 
psychiatric followup in the future as well.  The patient was also counseled 
that she would need to have her TSH rechecked in a month to have her thyroid 
medicine adjusted.  She was also given information about having an outpatient 
psychiatrist recheck her glucose and lipid panel in 1 month to rule out 
metabolic syndrome after starting Abilify.  She was given a copy of her 
laboratory results.



LEGAL STATUS:  Patient was admitted on an M1 hold and then converted to 
voluntary status and will be discharged to be receiving outpatient treatment on 
a voluntary basis.





Job #:  [id number]

MTDD